# Patient Record
Sex: MALE | Race: BLACK OR AFRICAN AMERICAN | Employment: FULL TIME | URBAN - METROPOLITAN AREA
[De-identification: names, ages, dates, MRNs, and addresses within clinical notes are randomized per-mention and may not be internally consistent; named-entity substitution may affect disease eponyms.]

---

## 2019-06-27 ENCOUNTER — OFFICE VISIT (OUTPATIENT)
Dept: FAMILY MEDICINE CLINIC | Facility: CLINIC | Age: 44
End: 2019-06-27
Payer: COMMERCIAL

## 2019-06-27 VITALS
HEART RATE: 75 BPM | TEMPERATURE: 100 F | SYSTOLIC BLOOD PRESSURE: 130 MMHG | OXYGEN SATURATION: 97 % | DIASTOLIC BLOOD PRESSURE: 80 MMHG | BODY MASS INDEX: 34.96 KG/M2 | RESPIRATION RATE: 18 BRPM | WEIGHT: 236 LBS | HEIGHT: 69 IN

## 2019-06-27 DIAGNOSIS — H72.92 RUPTURED EAR DRUM, LEFT: Primary | ICD-10-CM

## 2019-06-27 PROCEDURE — 99202 OFFICE O/P NEW SF 15 MIN: CPT | Performed by: NURSE PRACTITIONER

## 2019-06-27 RX ORDER — AMOXICILLIN 875 MG/1
875 TABLET, COATED ORAL 2 TIMES DAILY
Qty: 20 TABLET | Refills: 0 | Status: SHIPPED | OUTPATIENT
Start: 2019-06-27 | End: 2019-07-07

## 2019-06-27 RX ORDER — ALPRAZOLAM 0.5 MG/1
TABLET ORAL
Refills: 1 | COMMUNITY
Start: 2019-03-31

## 2019-06-27 NOTE — PATIENT INSTRUCTIONS
Ruptured Eardrum, Traumatic    The eardrum is a thin membrane about 1 inch from the opening of the ear canal. It is easily injured by objects put into the ear canal. It may also be injured by a loud noise close to the ear or a slap to the ear. A ruptured © 6789-3026 [employer] [address] [facility] [place], [facility] [place]. All rights reserved. This information is not intended as a substitute for professional medical care. Always follow your healthcare professional's instructions.

## 2019-06-29 NOTE — PROGRESS NOTES
CHIEF COMPLAINT:   Patient presents with:  Ear Pain: for 2 days, getting worse, fever and chills, no URI before ear pain      HPI:   Lanis Osler is a 53year old male who presents to clinic today with complaints of left ear pain. Has had for 2  days.  Pa EARS: Left Tragus only tender on palpation . External auditory canals healthy. Right TM: cloudy gray, no bulging, no retraction,scant effusion, bony landmarks obscured. Left TM: not visible. Noted + decreased in finger rub to left hear.    NOSE: nostrils p Sig: Take 1 tablet (875 mg total) by mouth 2 (two) times daily for 10 days. Risk and benefits of medication discussed. Stressed importance of completing full course of antibiotic.   May increase yogurt or take otc probiotic while on antibiotic the · Child is of any age and has repeated fevers above 104°F (40°C).   Fever in adults:  · Fever of 100.4°F (38°C)  Also call for any of the following:  · Fluid drainage from the ear for longer than 24 hours  · Increasing ear pain  · Worsening headache or dizz